# Patient Record
Sex: FEMALE | Race: WHITE | NOT HISPANIC OR LATINO | URBAN - METROPOLITAN AREA
[De-identification: names, ages, dates, MRNs, and addresses within clinical notes are randomized per-mention and may not be internally consistent; named-entity substitution may affect disease eponyms.]

---

## 2023-10-23 ENCOUNTER — OFFICE VISIT (OUTPATIENT)
Dept: URBAN - METROPOLITAN AREA CLINIC 70 | Facility: CLINIC | Age: 58
End: 2023-10-23
Payer: COMMERCIAL

## 2023-10-23 ENCOUNTER — DASHBOARD ENCOUNTERS (OUTPATIENT)
Age: 58
End: 2023-10-23

## 2023-10-23 ENCOUNTER — LAB OUTSIDE AN ENCOUNTER (OUTPATIENT)
Dept: URBAN - METROPOLITAN AREA CLINIC 70 | Facility: CLINIC | Age: 58
End: 2023-10-23

## 2023-10-23 VITALS
SYSTOLIC BLOOD PRESSURE: 134 MMHG | TEMPERATURE: 98.3 F | DIASTOLIC BLOOD PRESSURE: 5 MMHG | HEIGHT: 63 IN | BODY MASS INDEX: 35.61 KG/M2 | HEART RATE: 67 BPM | WEIGHT: 201 LBS

## 2023-10-23 DIAGNOSIS — Z12.11 COLON CANCER SCREENING: ICD-10-CM

## 2023-10-23 DIAGNOSIS — K21.9 GASTROESOPHAGEAL REFLUX DISEASE, UNSPECIFIED WHETHER ESOPHAGITIS PRESENT: ICD-10-CM

## 2023-10-23 DIAGNOSIS — K62.5 RECTAL BLEEDING: ICD-10-CM

## 2023-10-23 PROBLEM — 235595009: Status: ACTIVE | Noted: 2023-10-23

## 2023-10-23 PROCEDURE — 99204 OFFICE O/P NEW MOD 45 MIN: CPT | Performed by: NURSE PRACTITIONER

## 2023-10-23 RX ORDER — ALBUTEROL SULFATE 90 UG/1
INHALE 2 PUFFS BY MOUTH EVERY 4 HOURS AS NEEDED AEROSOL, METERED RESPIRATORY (INHALATION)
Qty: 6.7 GRAM | Refills: 0 | Status: ACTIVE | COMMUNITY

## 2023-10-23 RX ORDER — PREDNISONE 10 MG/1
FOLLOW PACKAGE DIRECTIONS TABLET ORAL
Qty: 48 EACH | Refills: 0 | Status: ACTIVE | COMMUNITY

## 2023-10-23 RX ORDER — MONTELUKAST SODIUM 10 MG/1
TAKE 1 TABLET BY MOUTH EVERY DAY FOR CHRONIC ALLERGIES TABLET, FILM COATED ORAL
Qty: 90 EACH | Refills: 1 | Status: ACTIVE | COMMUNITY

## 2023-10-23 RX ORDER — LISINOPRIL AND HYDROCHLOROTHIAZIDE 12.5; 2 MG/1; MG/1
TAKE 1 TABLET BY MOUTH EVERY DAY TABLET ORAL
Qty: 90 EACH | Refills: 0 | Status: ACTIVE | COMMUNITY

## 2023-10-23 RX ORDER — ALBUTEROL SULFATE 2.5 MG/3ML
USE 3 ML VIA NEBULIZER EVERY 6 HOURS AS NEEDED SOLUTION RESPIRATORY (INHALATION)
Qty: 360 MILLILITER | Refills: 0 | Status: ACTIVE | COMMUNITY

## 2023-10-23 RX ORDER — FLUCONAZOLE 150 MG/1
TAKE 1 TABLET BY MOUTH EVERY DAY FOR 3 DAYS TABLET ORAL
Qty: 3 EACH | Refills: 0 | Status: ACTIVE | COMMUNITY

## 2023-10-23 RX ORDER — DOXYCYCLINE HYCLATE 100 MG/1
TAKE 1 TABLET BY MOUTH TWICE DAILY FOR 10 DAYS TABLET, FILM COATED ORAL
Qty: 20 EACH | Refills: 0 | Status: ACTIVE | COMMUNITY

## 2023-10-23 RX ORDER — ONDANSETRON 4 MG/1
TAKE 1 TABLET BY MOUTH EVERY DAY FOR 10 DAYS TABLET, FILM COATED ORAL
Qty: 10 EACH | Refills: 0 | Status: ACTIVE | COMMUNITY

## 2023-10-23 RX ORDER — ESTERIFIED ESTROGENS AND METHYLTESTOSTERONE .625; 1.25 MG/1; MG/1
TAKE 1 TABLET BY MOUTH EVERY DAY TABLET ORAL
Qty: 30 EACH | Refills: 1 | Status: ACTIVE | COMMUNITY

## 2023-10-23 NOTE — HPI-TODAY'S VISIT:
Patient is a 59 y/o female who presents for evaluation of rectal bleeding. She is previously known to our group with undergoing a screening colonoscopy in 2016 with normal findings. No Fhx of colon cancer. Today she reports BRBPR after BM that began last week. Bleeding has now resolved with no bleeding in last 2 days. No hematemesis or melena. No rectal pain or trauma. Admits to frequent GERD despite chronic daily PPI for years. Denies CP, SOB, dizziness, abdominal pain, N/V, dysphagia, wt loss, constipation, or diarrhea. VSS. Currently taking antibiotics for URI with some mild cough. 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 She also has a hx of tachycardia that is being followed by [facility] (prior caths negative; routine echo and heart CT in progress; instructed pt to let our office know if there are positive findings).
4

## 2023-12-04 ENCOUNTER — OFFICE VISIT (OUTPATIENT)
Dept: URBAN - METROPOLITAN AREA MEDICAL CENTER 42 | Facility: MEDICAL CENTER | Age: 58
End: 2023-12-04